# Patient Record
Sex: FEMALE | Race: OTHER | HISPANIC OR LATINO | URBAN - METROPOLITAN AREA
[De-identification: names, ages, dates, MRNs, and addresses within clinical notes are randomized per-mention and may not be internally consistent; named-entity substitution may affect disease eponyms.]

---

## 2018-12-01 ENCOUNTER — OUTPATIENT (OUTPATIENT)
Dept: OUTPATIENT SERVICES | Facility: HOSPITAL | Age: 23
LOS: 1 days | End: 2018-12-01
Payer: MEDICAID

## 2018-12-01 PROCEDURE — G9001: CPT

## 2018-12-12 DIAGNOSIS — Z71.89 OTHER SPECIFIED COUNSELING: ICD-10-CM

## 2019-07-02 ENCOUNTER — OUTPATIENT (OUTPATIENT)
Dept: OUTPATIENT SERVICES | Facility: HOSPITAL | Age: 24
LOS: 1 days | End: 2019-07-02
Payer: MEDICAID

## 2019-07-02 VITALS
OXYGEN SATURATION: 99 % | HEART RATE: 75 BPM | RESPIRATION RATE: 15 BRPM | HEIGHT: 63 IN | DIASTOLIC BLOOD PRESSURE: 73 MMHG | TEMPERATURE: 98 F | SYSTOLIC BLOOD PRESSURE: 145 MMHG | WEIGHT: 214.95 LBS

## 2019-07-02 VITALS
OXYGEN SATURATION: 99 % | DIASTOLIC BLOOD PRESSURE: 65 MMHG | HEART RATE: 68 BPM | SYSTOLIC BLOOD PRESSURE: 114 MMHG | RESPIRATION RATE: 20 BRPM

## 2019-07-02 DIAGNOSIS — Z90.49 ACQUIRED ABSENCE OF OTHER SPECIFIED PARTS OF DIGESTIVE TRACT: Chronic | ICD-10-CM

## 2019-07-02 DIAGNOSIS — O02.1 MISSED ABORTION: ICD-10-CM

## 2019-07-02 DIAGNOSIS — Z98.891 HISTORY OF UTERINE SCAR FROM PREVIOUS SURGERY: Chronic | ICD-10-CM

## 2019-07-02 DIAGNOSIS — Z98.890 OTHER SPECIFIED POSTPROCEDURAL STATES: Chronic | ICD-10-CM

## 2019-07-02 LAB
HCT VFR BLD CALC: 36.9 % — SIGNIFICANT CHANGE UP (ref 34.5–45)
HGB BLD-MCNC: 12.4 G/DL — SIGNIFICANT CHANGE UP (ref 11.5–15.5)
MCHC RBC-ENTMCNC: 28.8 PG — SIGNIFICANT CHANGE UP (ref 27–34)
MCHC RBC-ENTMCNC: 33.6 GM/DL — SIGNIFICANT CHANGE UP (ref 32–36)
MCV RBC AUTO: 85.6 FL — SIGNIFICANT CHANGE UP (ref 80–100)
NRBC # BLD: 0 /100 WBCS — SIGNIFICANT CHANGE UP (ref 0–0)
PLATELET # BLD AUTO: 376 K/UL — SIGNIFICANT CHANGE UP (ref 150–400)
RBC # BLD: 4.31 M/UL — SIGNIFICANT CHANGE UP (ref 3.8–5.2)
RBC # FLD: 12.8 % — SIGNIFICANT CHANGE UP (ref 10.3–14.5)
WBC # BLD: 8.54 K/UL — SIGNIFICANT CHANGE UP (ref 3.8–10.5)
WBC # FLD AUTO: 8.54 K/UL — SIGNIFICANT CHANGE UP (ref 3.8–10.5)

## 2019-07-02 PROCEDURE — 86850 RBC ANTIBODY SCREEN: CPT

## 2019-07-02 PROCEDURE — 88305 TISSUE EXAM BY PATHOLOGIST: CPT

## 2019-07-02 PROCEDURE — 36415 COLL VENOUS BLD VENIPUNCTURE: CPT

## 2019-07-02 PROCEDURE — 85027 COMPLETE CBC AUTOMATED: CPT

## 2019-07-02 PROCEDURE — 86901 BLOOD TYPING SEROLOGIC RH(D): CPT

## 2019-07-02 PROCEDURE — 88305 TISSUE EXAM BY PATHOLOGIST: CPT | Mod: 26

## 2019-07-02 PROCEDURE — 59820 CARE OF MISCARRIAGE: CPT

## 2019-07-02 PROCEDURE — 86900 BLOOD TYPING SEROLOGIC ABO: CPT

## 2019-07-02 RX ORDER — SODIUM CHLORIDE 9 MG/ML
1000 INJECTION, SOLUTION INTRAVENOUS
Refills: 0 | Status: DISCONTINUED | OUTPATIENT
Start: 2019-07-02 | End: 2019-07-02

## 2019-07-02 RX ORDER — HYDROMORPHONE HYDROCHLORIDE 2 MG/ML
0.5 INJECTION INTRAMUSCULAR; INTRAVENOUS; SUBCUTANEOUS
Refills: 0 | Status: DISCONTINUED | OUTPATIENT
Start: 2019-07-02 | End: 2019-07-02

## 2019-07-02 RX ORDER — METOCLOPRAMIDE HCL 10 MG
5 TABLET ORAL ONCE
Refills: 0 | Status: DISCONTINUED | OUTPATIENT
Start: 2019-07-02 | End: 2019-07-02

## 2019-07-02 RX ORDER — OXYCODONE HYDROCHLORIDE 5 MG/1
5 TABLET ORAL ONCE
Refills: 0 | Status: DISCONTINUED | OUTPATIENT
Start: 2019-07-02 | End: 2019-07-02

## 2019-07-02 RX ORDER — OXYCODONE HYDROCHLORIDE 5 MG/1
10 TABLET ORAL ONCE
Refills: 0 | Status: DISCONTINUED | OUTPATIENT
Start: 2019-07-02 | End: 2019-07-02

## 2019-07-02 NOTE — BRIEF OPERATIVE NOTE - NSICDXBRIEFPREOP_GEN_ALL_CORE_FT
PRE-OP DIAGNOSIS:  Missed  with fetal demise before 20 completed weeks of gestation 2019 09:55:18  Darrell Maynard

## 2019-07-02 NOTE — H&P ADULT - PROBLEM SELECTOR PLAN 1
1. For OR today with Dr. Maynard (for missed ab)  2. Maintain NPO status  3. IVF, SCD's  4. Plan for D/C to home post-op from PACU

## 2019-07-02 NOTE — H&P ADULT - NSHPSOCIALHISTORY_GEN_ALL_CORE
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

## 2019-07-02 NOTE — H&P ADULT - HISTORY OF PRESENT ILLNESS
This is a pleasant, somewhat stoic 23-yo female who presents today for D&C for a missed  with Dr. Maynard.  Patient reports having a miscarriage back in February (5 months ago), and became pregnant recently (Menstrual cycle had not resumed since miscarriage).  Patient was seen in Dr. Maynard's office last Wednesday where pelvic/abd sono was performed that revealed no fetal HB.  Currently, patient denies any abdominal or pelvic pain, vaginal bleeding, CP, SOB, dyspnea, N/V, dysuria/hematuria/urinary complaints, rectal bleeding/hematochezia/problems or changes in bowel habits.  Currently resting comfortably on stretcher in HR.

## 2019-07-02 NOTE — H&P ADULT - NSHPLABSRESULTS_GEN_ALL_CORE
CBC Full  -  ( 02 Jul 2019 08:37 )  WBC Count : 8.54 K/uL  RBC Count : 4.31 M/uL  Hemoglobin : 12.4 g/dL  Hematocrit : 36.9 %  Platelet Count - Automated : 376 K/uL  Mean Cell Volume : 85.6 fl  Mean Cell Hemoglobin : 28.8 pg  Mean Cell Hemoglobin Concentration : 33.6 gm/dL  Auto Neutrophil # : x  Auto Lymphocyte # : x  Auto Monocyte # : x  Auto Eosinophil # : x  Auto Basophil # : x  Auto Neutrophil % : x  Auto Lymphocyte % : x  Auto Monocyte % : x  Auto Eosinophil % : x  Auto Basophil % : x

## 2019-07-02 NOTE — H&P ADULT - RS GEN PE MLT RESP DETAILS PC
normal/clear to auscultation bilaterally/airway patent/breath sounds equal/good air movement/respirations non-labored

## 2019-07-02 NOTE — BRIEF OPERATIVE NOTE - NSICDXBRIEFPROCEDURE_GEN_ALL_CORE_FT
PROCEDURES:  Dilation and evacuation, uterus, using suction curettage 02-Jul-2019 09:55:01  Darrell Maynard

## 2019-07-03 LAB — SURGICAL PATHOLOGY STUDY: SIGNIFICANT CHANGE UP

## 2022-01-13 NOTE — ASU PREOP CHECKLIST - AS BP NONINV METHOD
-- DO NOT REPLY / DO NOT REPLY ALL --  -- Message is from the [facility]--    General Patient Message      Reason for Call: Pt called to notify doctor she tested positive for covid on 1/4. It's been 10 days of isolation already and she is asking for doctors advice if she should get the booster shot. Please advise.    Caller Information       Type Contact Phone    01/13/2022 09:54 AM CST Phone (Incoming) Joce Wongby DELROY (Self) [phone] (M)          Alternative phone number:     Turnaround time given to caller:   \"This message will be sent to [state Provider's name]. The clinical team will fulfill your request as soon as they review your message.\"     electronic

## 2023-01-17 NOTE — ASU DISCHARGE PLAN (ADULT/PEDIATRIC) - ACTIVITY LEVEL
Clearance for jez hold  faxed to [phone] at Urology office for upcoming biopsy   Patient aware
No tampons/No tub baths/No intercourse/Nothing per vagina/No douching